# Patient Record
Sex: FEMALE | Race: WHITE | Employment: FULL TIME | URBAN - METROPOLITAN AREA
[De-identification: names, ages, dates, MRNs, and addresses within clinical notes are randomized per-mention and may not be internally consistent; named-entity substitution may affect disease eponyms.]

---

## 2018-12-14 ENCOUNTER — OFFICE VISIT (OUTPATIENT)
Dept: PRIMARY CARE CLINIC | Age: 23
End: 2018-12-14
Payer: COMMERCIAL

## 2018-12-14 VITALS
WEIGHT: 165.2 LBS | OXYGEN SATURATION: 98 % | DIASTOLIC BLOOD PRESSURE: 80 MMHG | TEMPERATURE: 98.1 F | SYSTOLIC BLOOD PRESSURE: 126 MMHG | HEART RATE: 73 BPM | HEIGHT: 70 IN | BODY MASS INDEX: 23.65 KG/M2

## 2018-12-14 DIAGNOSIS — Z23 NEED FOR VACCINATION: ICD-10-CM

## 2018-12-14 DIAGNOSIS — R06.6 CHRONIC HICCOUGHS: Primary | ICD-10-CM

## 2018-12-14 DIAGNOSIS — L20.9 ATOPIC DERMATITIS, UNSPECIFIED TYPE: ICD-10-CM

## 2018-12-14 DIAGNOSIS — Z11.4 ENCOUNTER FOR SCREENING FOR HIV: ICD-10-CM

## 2018-12-14 DIAGNOSIS — R13.10 DYSPHAGIA, UNSPECIFIED TYPE: ICD-10-CM

## 2018-12-14 PROCEDURE — 90715 TDAP VACCINE 7 YRS/> IM: CPT | Performed by: PHYSICIAN ASSISTANT

## 2018-12-14 PROCEDURE — 90472 IMMUNIZATION ADMIN EACH ADD: CPT | Performed by: PHYSICIAN ASSISTANT

## 2018-12-14 PROCEDURE — 90686 IIV4 VACC NO PRSV 0.5 ML IM: CPT | Performed by: PHYSICIAN ASSISTANT

## 2018-12-14 PROCEDURE — 99204 OFFICE O/P NEW MOD 45 MIN: CPT | Performed by: PHYSICIAN ASSISTANT

## 2018-12-14 PROCEDURE — 90471 IMMUNIZATION ADMIN: CPT | Performed by: PHYSICIAN ASSISTANT

## 2018-12-14 RX ORDER — NORGESTIMATE AND ETHINYL ESTRADIOL 7DAYSX3 28
1 KIT ORAL DAILY
Refills: 5 | COMMUNITY
Start: 2018-11-27 | End: 2021-04-02

## 2018-12-14 ASSESSMENT — ENCOUNTER SYMPTOMS
SINUS PAIN: 0
GASTROINTESTINAL NEGATIVE: 1
VOICE CHANGE: 1
RHINORRHEA: 0
SINUS PRESSURE: 0
TROUBLE SWALLOWING: 1
RESPIRATORY NEGATIVE: 1

## 2018-12-14 ASSESSMENT — PATIENT HEALTH QUESTIONNAIRE - PHQ9
SUM OF ALL RESPONSES TO PHQ9 QUESTIONS 1 & 2: 0
2. FEELING DOWN, DEPRESSED OR HOPELESS: 0
SUM OF ALL RESPONSES TO PHQ QUESTIONS 1-9: 0
SUM OF ALL RESPONSES TO PHQ QUESTIONS 1-9: 0
1. LITTLE INTEREST OR PLEASURE IN DOING THINGS: 0

## 2018-12-14 NOTE — PROGRESS NOTES
717 [facility] PRIMARY CARE  [id number] [facility]  Dept: 115 Ben Patton is a 21 y.o. female who presents today as an new patient for her medical conditionsas noted below. Chief Complaint   Patient presents with    New Patient     Last PCP: Dr. Letty Robbins.    Kendra Stewart     has them once every 2 hours. 3 times in the past 1.5 months her hiccups have made her vomit. HPI:     HPI  Sees GYN: [facility]   UTD    Can't burp and occ trouble swallowing. Hiccups every 2 hours or so and sometimes cluster of hiccups every since childhood    History of atopic dermatitis : has very dry face and chronic intermittent dry spots on arms and legs      No results found for: LDLCHOLESTEROL, LDLCALC    (goal LDL is <100)   No results found for: AST, ALT, BUN  BP Readings from Last 3 Encounters:   18 126/80          (goal 120/80)    Past Medical History:   Diagnosis Date    Ankle sprain     left    Eczema     Flat foot     Heart murmur of      Knee dislocation     bilateral    Wrist fracture, left       Past Surgical History:   Procedure Laterality Date    WISDOM TOOTH EXTRACTION         Family History   Problem Relation Age of Onset    Atrial Fibrillation Mother     Breast Cancer Maternal Aunt     Heart Failure Maternal Grandmother     Cancer Maternal Grandfather         multiple myeloma    Heart Disease Paternal Grandfather        Social History   Substance Use Topics    Smoking status: Never Smoker    Smokeless tobacco: Never Used    Alcohol use 2.4 - 3.0 oz/week     4 - 5 Cans of beer per week      Current Outpatient Prescriptions   Medication Sig Dispense Refill    TRI-SPRINTEC 0.18/0.215/0.25 MG-35 MCG TABS Take 1 tablet by mouth daily  5     No current facility-administered medications for this visit.       No Known Allergies    Health Maintenance   Topic Date Due    HIV screen

## 2019-01-07 DIAGNOSIS — Z11.4 ENCOUNTER FOR SCREENING FOR HIV: ICD-10-CM

## 2019-01-08 ENCOUNTER — OFFICE VISIT (OUTPATIENT)
Dept: PRIMARY CARE CLINIC | Age: 24
End: 2019-01-08
Payer: COMMERCIAL

## 2019-01-08 VITALS
OXYGEN SATURATION: 98 % | BODY MASS INDEX: 24.22 KG/M2 | SYSTOLIC BLOOD PRESSURE: 118 MMHG | WEIGHT: 167.6 LBS | HEART RATE: 71 BPM | DIASTOLIC BLOOD PRESSURE: 80 MMHG

## 2019-01-08 DIAGNOSIS — L20.9 ATOPIC DERMATITIS, UNSPECIFIED TYPE: Primary | ICD-10-CM

## 2019-01-08 DIAGNOSIS — Z11.1 PPD SCREENING TEST: ICD-10-CM

## 2019-01-08 DIAGNOSIS — L70.0 ACNE VULGARIS: ICD-10-CM

## 2019-01-08 PROCEDURE — 99213 OFFICE O/P EST LOW 20 MIN: CPT | Performed by: PHYSICIAN ASSISTANT

## 2019-01-08 PROCEDURE — 86580 TB INTRADERMAL TEST: CPT | Performed by: PHYSICIAN ASSISTANT

## 2019-01-10 ENCOUNTER — NURSE ONLY (OUTPATIENT)
Dept: PRIMARY CARE CLINIC | Age: 24
End: 2019-01-10

## 2019-01-10 DIAGNOSIS — Z11.1 ENCOUNTER FOR PPD SKIN TEST READING: Primary | ICD-10-CM

## 2019-01-10 LAB
INDURATION: NORMAL
TB SKIN TEST: NEGATIVE

## 2019-01-10 PROCEDURE — 99999 PR OFFICE/OUTPT VISIT,PROCEDURE ONLY: CPT | Performed by: PHYSICIAN ASSISTANT

## 2019-01-15 ENCOUNTER — NURSE ONLY (OUTPATIENT)
Dept: PRIMARY CARE CLINIC | Age: 24
End: 2019-01-15
Payer: COMMERCIAL

## 2019-01-15 DIAGNOSIS — Z11.1 SCREENING FOR TUBERCULOSIS: Primary | ICD-10-CM

## 2019-01-15 PROCEDURE — 86580 TB INTRADERMAL TEST: CPT | Performed by: PHYSICIAN ASSISTANT

## 2019-01-17 ENCOUNTER — NURSE ONLY (OUTPATIENT)
Dept: PRIMARY CARE CLINIC | Age: 24
End: 2019-01-17

## 2019-01-17 DIAGNOSIS — Z11.1 ENCOUNTER FOR PPD SKIN TEST READING: Primary | ICD-10-CM

## 2019-01-17 LAB
INDURATION: NORMAL
TB SKIN TEST: NEGATIVE

## 2019-02-19 ENCOUNTER — OFFICE VISIT (OUTPATIENT)
Dept: PRIMARY CARE CLINIC | Age: 24
End: 2019-02-19
Payer: COMMERCIAL

## 2019-02-19 VITALS
SYSTOLIC BLOOD PRESSURE: 130 MMHG | HEART RATE: 72 BPM | DIASTOLIC BLOOD PRESSURE: 84 MMHG | WEIGHT: 165.4 LBS | BODY MASS INDEX: 23.9 KG/M2 | OXYGEN SATURATION: 98 %

## 2019-02-19 DIAGNOSIS — L70.0 ACNE VULGARIS: Primary | ICD-10-CM

## 2019-02-19 DIAGNOSIS — L20.9 ATOPIC DERMATITIS, UNSPECIFIED TYPE: ICD-10-CM

## 2019-02-19 PROCEDURE — 99213 OFFICE O/P EST LOW 20 MIN: CPT | Performed by: PHYSICIAN ASSISTANT

## 2019-02-19 ASSESSMENT — ENCOUNTER SYMPTOMS
FACIAL SWELLING: 0
COLOR CHANGE: 0
RESPIRATORY NEGATIVE: 1
ABDOMINAL PAIN: 0
EYES NEGATIVE: 1

## 2019-02-19 ASSESSMENT — PATIENT HEALTH QUESTIONNAIRE - PHQ9
SUM OF ALL RESPONSES TO PHQ9 QUESTIONS 1 & 2: 0
SUM OF ALL RESPONSES TO PHQ QUESTIONS 1-9: 0
SUM OF ALL RESPONSES TO PHQ QUESTIONS 1-9: 0
1. LITTLE INTEREST OR PLEASURE IN DOING THINGS: 0
2. FEELING DOWN, DEPRESSED OR HOPELESS: 0

## 2019-10-30 ENCOUNTER — TELEPHONE (OUTPATIENT)
Dept: PRIMARY CARE CLINIC | Age: 24
End: 2019-10-30

## 2019-10-30 DIAGNOSIS — M25.562 ACUTE PAIN OF LEFT KNEE: Primary | ICD-10-CM

## 2019-10-30 DIAGNOSIS — S83.105A DISLOCATION OF LEFT KNEE, INITIAL ENCOUNTER: ICD-10-CM

## 2019-11-04 DIAGNOSIS — M25.562 ACUTE PAIN OF LEFT KNEE: ICD-10-CM

## 2019-11-04 DIAGNOSIS — S83.105A DISLOCATION OF LEFT KNEE, INITIAL ENCOUNTER: ICD-10-CM

## 2021-04-02 ENCOUNTER — OFFICE VISIT (OUTPATIENT)
Dept: PRIMARY CARE CLINIC | Age: 26
End: 2021-04-02
Payer: COMMERCIAL

## 2021-04-02 VITALS
BODY MASS INDEX: 24.2 KG/M2 | OXYGEN SATURATION: 98 % | DIASTOLIC BLOOD PRESSURE: 76 MMHG | HEART RATE: 79 BPM | SYSTOLIC BLOOD PRESSURE: 128 MMHG | WEIGHT: 169 LBS | HEIGHT: 70 IN | TEMPERATURE: 97.4 F

## 2021-04-02 DIAGNOSIS — R00.0 TACHYCARDIA: Primary | ICD-10-CM

## 2021-04-02 DIAGNOSIS — R55 NEAR SYNCOPE: ICD-10-CM

## 2021-04-02 DIAGNOSIS — M24.9 HYPERMOBILITY OF JOINT: ICD-10-CM

## 2021-04-02 DIAGNOSIS — R68.89 SENSATION OF FEELING HOT: ICD-10-CM

## 2021-04-02 PROCEDURE — 99214 OFFICE O/P EST MOD 30 MIN: CPT | Performed by: PHYSICIAN ASSISTANT

## 2021-04-02 RX ORDER — NORETHINDRONE ACETATE AND ETHINYL ESTRADIOL AND FERROUS FUMARATE 1MG-20(24)
KIT ORAL
COMMUNITY
Start: 2020-06-02

## 2021-04-02 SDOH — ECONOMIC STABILITY: TRANSPORTATION INSECURITY
IN THE PAST 12 MONTHS, HAS LACK OF TRANSPORTATION KEPT YOU FROM MEETINGS, WORK, OR FROM GETTING THINGS NEEDED FOR DAILY LIVING?: NO

## 2021-04-02 SDOH — ECONOMIC STABILITY: FOOD INSECURITY: WITHIN THE PAST 12 MONTHS, THE FOOD YOU BOUGHT JUST DIDN'T LAST AND YOU DIDN'T HAVE MONEY TO GET MORE.: NEVER TRUE

## 2021-04-02 SDOH — ECONOMIC STABILITY: TRANSPORTATION INSECURITY
IN THE PAST 12 MONTHS, HAS THE LACK OF TRANSPORTATION KEPT YOU FROM MEDICAL APPOINTMENTS OR FROM GETTING MEDICATIONS?: NO

## 2021-04-02 ASSESSMENT — ENCOUNTER SYMPTOMS
VOMITING: 1
SINUS PAIN: 0
SINUS PRESSURE: 0
SORE THROAT: 0
NAUSEA: 1
ABDOMINAL PAIN: 0
EYE PAIN: 0
SHORTNESS OF BREATH: 0
CONSTIPATION: 0
CHEST TIGHTNESS: 0
DIARRHEA: 0

## 2021-04-02 ASSESSMENT — PATIENT HEALTH QUESTIONNAIRE - PHQ9
1. LITTLE INTEREST OR PLEASURE IN DOING THINGS: 0
2. FEELING DOWN, DEPRESSED OR HOPELESS: 0
SUM OF ALL RESPONSES TO PHQ QUESTIONS 1-9: 0
SUM OF ALL RESPONSES TO PHQ9 QUESTIONS 1 & 2: 0

## 2021-04-02 NOTE — PROGRESS NOTES
717 [facility] PRIMARY CARE  [address] [place] [id number]  Dept: 115 Ben Patton is a 32 y.o. female who presents today for her medical conditions/complaints as noted below. Chief Complaint   Patient presents with    Other     patient concerned for Connective tissue issues. Patient said she had Episodes of inability to regulate body temp, increase HR with excercise and hyper flexibility. Patient said she has hx of both knees nd shoulders dislocating. HPI:     HPI     Pt states she has hypermobile joints, multiple bilateral knee and shoulder dislocations, double jointed in all finger, long fingers, and easy bruising. She states she had a heart murmur as an infant that she states has closed. She states she has episodes were she feels like she is over heating, sees \"black spots\", nausea, and vomiting. States episodes occur approximately twice a month. She denies any activity or position that causes the episodes. Pt state she has an \"exersize intolerance\". She states she feels her heart \"pounding\" and her heart rate increases to approximately 180 bpm which she monitors with apple watch. Pt denies any family history of connective tissue disorders but states her brother and sister has similar physical symptoms. Pt states she cannot belch and has episodes of painful hicupping that can last for minutes to hours. Also sometimes feels a fullness in her epigastric area.       No results found for: LDLCHOLESTEROL, LDLCALC    (goal LDL is <100)   No results found for: AST, ALT, BUN  BP Readings from Last 3 Encounters:   21 128/76   19 130/84   19 118/80          (goal 120/80)    Past Medical History:   Diagnosis Date    Ankle sprain     left    Eczema     Flat foot     Heart murmur of      Knee dislocation     bilateral    Wrist fracture, left       Past Surgical History:   Procedure Laterality Date    DENISHA TOOTH EXTRACTION         Family History   Problem Relation Age of Onset    Atrial Fibrillation Mother     Breast Cancer Maternal Aunt     Heart Failure Maternal Grandmother     Cancer Maternal Grandfather         multiple myeloma    Heart Disease Paternal Grandfather        Social History     Tobacco Use    Smoking status: Never Smoker    Smokeless tobacco: Never Used   Substance Use Topics    Alcohol use: Yes     Alcohol/week: 4.0 - 5.0 standard drinks     Types: 4 - 5 Cans of beer per week      Current Outpatient Medications   Medication Sig Dispense Refill    Norethin Ace-Eth Estrad-FE (NESTOR 24 FE) 1-20 MG-MCG(24) CHEW       NONFORMULARY       tretinoin (RETIN-A) 0.025 % cream Apply topically nightly. 45 g 0     No current facility-administered medications for this visit. Allergies   Allergen Reactions    Nickel Rash       Health Maintenance   Topic Date Due    Hepatitis C screen  Never done    HPV vaccine (1 - 2-dose series) Never done    COVID-19 Vaccine (1) Never done    Cervical cancer screen  Never done    Flu vaccine (Season Ended) 09/01/2021    DTaP/Tdap/Td vaccine (8 - Td) 12/14/2028    Hepatitis B vaccine  Completed    Hib vaccine  Completed    Meningococcal (ACWY) vaccine  Completed    HIV screen  Completed    Hepatitis A vaccine  Aged Out    Pneumococcal 0-64 years Vaccine  Aged Out    Varicella vaccine  Discontinued       Subjective:      Review of Systems   Constitutional: Negative for appetite change, chills, fatigue, fever and unexpected weight change. HENT: Negative for ear pain, mouth sores, sinus pressure, sinus pain and sore throat. No mouth sores   Eyes: Positive for visual disturbance. Negative for pain. Respiratory: Negative for chest tightness and shortness of breath. Cardiovascular: Positive for palpitations (\"heart racing\"). Negative for chest pain and leg swelling. Gastrointestinal: Positive for nausea and vomiting.  Negative for abdominal pain, constipation and diarrhea. Endocrine: Negative for cold intolerance and heat intolerance. Genitourinary: Negative for dysuria, frequency and urgency. Musculoskeletal: Negative for arthralgias, joint swelling and myalgias. Allergic/Immunologic: Negative for environmental allergies and food allergies. Neurological: Positive for dizziness (occasional when going from sitting to standing) and syncope (near syncope). Negative for numbness and headaches. Psychiatric/Behavioral: Negative for decreased concentration. Objective:     /76   Pulse 79   Temp 97.4 °F (36.3 °C)   Ht 5' 9.75\" (1.772 m)   Wt 169 lb (76.7 kg)   SpO2 98%   BMI 24.42 kg/m²   Physical Exam  Constitutional:       General: She is not in acute distress. Appearance: Normal appearance. She is normal weight. She is not ill-appearing, toxic-appearing or diaphoretic. HENT:      Head: Normocephalic. Cardiovascular:      Rate and Rhythm: Normal rate and regular rhythm. Heart sounds: Normal heart sounds. No murmur. No gallop. Pulmonary:      Effort: Pulmonary effort is normal. No respiratory distress. Breath sounds: Normal breath sounds. No stridor. No wheezing, rhonchi or rales. Skin:     General: Skin is warm and dry. Coloration: Skin is not jaundiced. Findings: No bruising. Neurological:      General: No focal deficit present. Mental Status: She is alert and oriented to person, place, and time. Psychiatric:         Mood and Affect: Mood normal.         Behavior: Behavior normal.         Thought Content: Thought content normal.         Judgment: Judgment normal.         Assessment:       Diagnosis Orders   1. Tachycardia  JOANNA Screen with Reflex    C-Reactive Protein    Sedimentation rate, automated    Rheumatoid Factor    TSH without Reflex    T4, Free    CBC Auto Differential    Comprehensive Metabolic Panel, Fasting    ECHO Complete 2D W Doppler W Color   2.  Near syncope defined types were placed in this encounter. Patient given educational materials - see patient instructions. Discussed use, benefit, and side effects of prescribed medications. All patient questions answered. Pt voiced understanding. Reviewed health maintenance. Instructed to continue current medications, diet and exercise. Patient agreed with treatment plan. Follow up as directed.      Electronically signed by Carlos Echeverria PA-C on 4/2/2021 at 12:43 PM

## 2021-04-03 ENCOUNTER — HOSPITAL ENCOUNTER (OUTPATIENT)
Age: 26
Discharge: HOME | End: 2021-04-03
Payer: COMMERCIAL

## 2021-04-03 DIAGNOSIS — M24.9 HYPERMOBILITY OF JOINT: ICD-10-CM

## 2021-04-03 DIAGNOSIS — R68.89 SENSATION OF FEELING HOT: ICD-10-CM

## 2021-04-03 DIAGNOSIS — R00.0 TACHYCARDIA: ICD-10-CM

## 2021-04-03 DIAGNOSIS — R55 NEAR SYNCOPE: ICD-10-CM

## 2021-04-03 LAB
ABSOLUTE EOS #: 0.06 K/UL (ref 0–0.44)
ABSOLUTE IMMATURE GRANULOCYTE: <0.03 K/UL (ref 0–0.3)
ABSOLUTE LYMPH #: 1.83 K/UL (ref 1.1–3.7)
ABSOLUTE MONO #: 0.31 K/UL (ref 0.1–1.2)
ALBUMIN SERPL-MCNC: 4.2 G/DL (ref 3.5–5.2)
ALBUMIN/GLOBULIN RATIO: 1.4 (ref 1–2.5)
ALP BLD-CCNC: 52 U/L (ref 35–104)
ALT SERPL-CCNC: 9 U/L (ref 5–33)
ANION GAP SERPL CALCULATED.3IONS-SCNC: 10 MMOL/L (ref 9–17)
AST SERPL-CCNC: 13 U/L
BASOPHILS # BLD: 1 % (ref 0–2)
BASOPHILS ABSOLUTE: <0.03 K/UL (ref 0–0.2)
BILIRUB SERPL-MCNC: 0.8 MG/DL (ref 0.3–1.2)
BUN BLDV-MCNC: 11 MG/DL (ref 6–20)
BUN/CREAT BLD: NORMAL (ref 9–20)
C-REACTIVE PROTEIN: 6.3 MG/L (ref 0–5)
CALCIUM SERPL-MCNC: 9.2 MG/DL (ref 8.6–10.4)
CHLORIDE BLD-SCNC: 103 MMOL/L (ref 98–107)
CO2: 23 MMOL/L (ref 20–31)
CREAT SERPL-MCNC: 0.63 MG/DL (ref 0.5–0.9)
DIFFERENTIAL TYPE: ABNORMAL
EOSINOPHILS RELATIVE PERCENT: 1 % (ref 1–4)
GFR AFRICAN AMERICAN: >60 ML/MIN
GFR NON-AFRICAN AMERICAN: >60 ML/MIN
GFR SERPL CREATININE-BSD FRML MDRD: NORMAL ML/MIN/{1.73_M2}
GFR SERPL CREATININE-BSD FRML MDRD: NORMAL ML/MIN/{1.73_M2}
GLUCOSE FASTING: 87 MG/DL (ref 70–99)
HCT VFR BLD CALC: 41.9 % (ref 36.3–47.1)
HEMOGLOBIN: 13.8 G/DL (ref 11.9–15.1)
IMMATURE GRANULOCYTES: 1 %
LYMPHOCYTES # BLD: 42 % (ref 24–43)
MCH RBC QN AUTO: 28.8 PG (ref 25.2–33.5)
MCHC RBC AUTO-ENTMCNC: 32.9 G/DL (ref 28.4–34.8)
MCV RBC AUTO: 87.5 FL (ref 82.6–102.9)
MONOCYTES # BLD: 7 % (ref 3–12)
NRBC AUTOMATED: 0 PER 100 WBC
PDW BLD-RTO: 11.8 % (ref 11.8–14.4)
PLATELET # BLD: 252 K/UL (ref 138–453)
PLATELET ESTIMATE: ABNORMAL
PMV BLD AUTO: 9.5 FL (ref 8.1–13.5)
POTASSIUM SERPL-SCNC: 4.2 MMOL/L (ref 3.7–5.3)
RBC # BLD: 4.79 M/UL (ref 3.95–5.11)
RBC # BLD: ABNORMAL 10*6/UL
RHEUMATOID FACTOR: <10 IU/ML
SEDIMENTATION RATE, ERYTHROCYTE: 2 MM (ref 0–20)
SEG NEUTROPHILS: 48 % (ref 36–65)
SEGMENTED NEUTROPHILS ABSOLUTE COUNT: 2.08 K/UL (ref 1.5–8.1)
SODIUM BLD-SCNC: 136 MMOL/L (ref 135–144)
THYROXINE, FREE: 1.34 NG/DL (ref 0.93–1.7)
TOTAL PROTEIN: 7.1 G/DL (ref 6.4–8.3)
TSH SERPL DL<=0.05 MIU/L-ACNC: 2 MIU/L (ref 0.3–5)
WBC # BLD: 4.3 K/UL (ref 3.5–11.3)
WBC # BLD: ABNORMAL 10*3/UL

## 2021-04-03 PROCEDURE — 84439 ASSAY OF FREE THYROXINE: CPT

## 2021-04-03 PROCEDURE — 85025 COMPLETE CBC W/AUTO DIFF WBC: CPT

## 2021-04-03 PROCEDURE — 84443 ASSAY THYROID STIM HORMONE: CPT

## 2021-04-03 PROCEDURE — 86038 ANTINUCLEAR ANTIBODIES: CPT

## 2021-04-03 PROCEDURE — 86431 RHEUMATOID FACTOR QUANT: CPT

## 2021-04-03 PROCEDURE — 86140 C-REACTIVE PROTEIN: CPT

## 2021-04-03 PROCEDURE — 80053 COMPREHEN METABOLIC PANEL: CPT

## 2021-04-03 PROCEDURE — 36415 COLL VENOUS BLD VENIPUNCTURE: CPT

## 2021-04-03 PROCEDURE — 85652 RBC SED RATE AUTOMATED: CPT

## 2021-04-05 LAB — ANTI-NUCLEAR ANTIBODY (ANA): NEGATIVE

## 2021-04-08 ENCOUNTER — HOSPITAL ENCOUNTER (OUTPATIENT)
Dept: NON INVASIVE DIAGNOSTICS | Age: 26
Discharge: HOME | End: 2021-04-10
Payer: COMMERCIAL

## 2021-04-08 DIAGNOSIS — R00.0 TACHYCARDIA: ICD-10-CM

## 2021-04-08 DIAGNOSIS — R55 NEAR SYNCOPE: ICD-10-CM

## 2021-04-08 LAB
LV EF: 55 %
LVEF MODALITY: NORMAL

## 2021-04-08 PROCEDURE — 93306 TTE W/DOPPLER COMPLETE: CPT

## 2021-05-06 ENCOUNTER — OFFICE VISIT (OUTPATIENT)
Dept: PRIMARY CARE CLINIC | Age: 26
End: 2021-05-06
Payer: COMMERCIAL

## 2021-05-06 VITALS
HEIGHT: 70 IN | DIASTOLIC BLOOD PRESSURE: 70 MMHG | SYSTOLIC BLOOD PRESSURE: 124 MMHG | BODY MASS INDEX: 24.05 KG/M2 | OXYGEN SATURATION: 95 % | HEART RATE: 87 BPM | WEIGHT: 168 LBS

## 2021-05-06 DIAGNOSIS — R55 NEAR SYNCOPE: ICD-10-CM

## 2021-05-06 DIAGNOSIS — M24.9 HYPERMOBILITY OF JOINT: ICD-10-CM

## 2021-05-06 DIAGNOSIS — R68.89 SENSATION OF FEELING HOT: ICD-10-CM

## 2021-05-06 DIAGNOSIS — R00.0 TACHYCARDIA: Primary | ICD-10-CM

## 2021-05-06 PROCEDURE — 99213 OFFICE O/P EST LOW 20 MIN: CPT | Performed by: PHYSICIAN ASSISTANT

## 2021-05-06 ASSESSMENT — ENCOUNTER SYMPTOMS
RESPIRATORY NEGATIVE: 1
VOMITING: 1

## 2021-05-06 NOTE — PATIENT INSTRUCTIONS
Patient Education        Vasovagal Syncope: Care Instructions  Your Care Instructions     Vasovagal syncope (say \"uay-cim-LEAMeredith DOSS-kuh-pee\")is sudden dizziness or fainting that can be set off by things such as pain, stress, fear, or trauma. You may sweat or feel lightheaded, sick to your stomach, or tingly. The problem causes the heart rate to slow and the blood vessels to widen, or dilate, for a short time. When this happens, blood pools in the lower body, and less blood goes to the brain. You can usually get relief by lying down with your legs raised (elevated). This helps more blood to flow to your brain and may help relieve symptoms like feeling dizzy. Some doctors may recommend a technique that involves tensing your fists and arms. This type of fainting is often easy to predict. For example, it happens to some people when they see blood or have to get a shot. They may feel symptoms before they faint. An episode of vasovagal syncope usually responds well to self-care. Other treatment often isn't needed. But if the fainting keeps happening, your doctor may suggest further treatments. Follow-up care is a key part of your treatment and safety. Be sure to make and go to all appointments, and call your doctor if you are having problems. It's also a good idea to know your test results and keep a list of the medicines you take. How can you care for yourself at home? · Drink plenty of fluids to prevent dehydration. If you have kidney, heart, or liver disease and have to limit fluids, talk with your doctor before you increase your fluid intake. · Try to avoid things that you think may set off vasovagal syncope. · Talk to your doctor about any medicines you take. Some medicines may increase the chance of this condition occurring. · If you feel symptoms, lie down with your legs raised. Talk to your doctor about what to do if your symptoms come back. When should you call for help?    Call 911 anytime you think you may need emergency care. For example, call if:    · You have symptoms of a heart problem. These may include:  ? Chest pain or pressure. ? Severe trouble breathing. ? A fast or irregular heartbeat. Watch closely for changes in your health, and be sure to contact your doctor if:    · You have more episodes of fainting at home.     · You do not get better as expected. Where can you learn more? Go to [email or web address] and sign in to your [employer] account. Enter L754 in the [employer] box to learn more about \"Vasovagal Syncope: Care Instructions. \"     If you do not have an account, please click on the \"Sign Up Now\" link. Current as of: February 26, 2020               Content Version: 12.8  © 1068-9535 Healthwise, Incorporated. Care instructions adapted under license by [facility] ([facility]). If you have questions about a medical condition or this instruction, always ask your healthcare professional. [place] [address] any warranty or liability for your use of this information.

## 2021-05-06 NOTE — PROGRESS NOTES
717 [facility] PRIMARY CARE  [address] [address]  Dept: [address] Marta Wilhelm is a 32 y.o. female who presents today for her medical conditions/complaints as noted below. Chief Complaint   Patient presents with    Discuss Labs     discuss Echo and Lab work, results in chart        HPI:     HPI  Labs are normal  Echo is normal   Concern for Marfan's with arm span and symptoms. No results found for: LDLCHOLESTEROL, LDLCALC    (goal LDL is <100)   AST (U/L)   Date Value   2021 13     ALT (U/L)   Date Value   2021 9     BUN (mg/dL)   Date Value   2021 11     BP Readings from Last 3 Encounters:   21 124/70   21 128/76   19 130/84          (goal 120/80)    Past Medical History:   Diagnosis Date    Ankle sprain     left    Eczema     Flat foot     Heart murmur of      Knee dislocation     bilateral    Wrist fracture, left       Past Surgical History:   Procedure Laterality Date    WISDOM TOOTH EXTRACTION         Family History   Problem Relation Age of Onset    Atrial Fibrillation Mother     Breast Cancer Maternal Aunt     Heart Failure Maternal Grandmother     Cancer Maternal Grandfather         multiple myeloma    Heart Disease Paternal Grandfather        Social History     Tobacco Use    Smoking status: Never Smoker    Smokeless tobacco: Never Used   Substance Use Topics    Alcohol use: Yes     Alcohol/week: 4.0 - 5.0 standard drinks     Types: 4 - 5 Cans of beer per week      Current Outpatient Medications   Medication Sig Dispense Refill    Norethin Ace-Eth Estrad-FE (NESTOR 24 FE) 1-20 MG-MCG(24) CHEW       NONFORMULARY       tretinoin (RETIN-A) 0.025 % cream Apply topically nightly. 45 g 0     No current facility-administered medications for this visit.       Allergies   Allergen Reactions    Nickel Rash       Health Maintenance   Topic Date Due    Hepatitis C screen Never done    HPV vaccine (1 - 2-dose series) Never done    COVID-19 Vaccine (1) Never done    Cervical cancer screen  Never done    Flu vaccine (Season Ended) 09/01/2021    DTaP/Tdap/Td vaccine (8 - Td) 12/14/2028    Hepatitis B vaccine  Completed    Hib vaccine  Completed    Meningococcal (ACWY) vaccine  Completed    HIV screen  Completed    Hepatitis A vaccine  Aged Out    Pneumococcal 0-64 years Vaccine  Aged Out    Varicella vaccine  Discontinued       Subjective:      Review of Systems   Constitutional: Positive for fatigue. Negative for chills, diaphoresis and fever. Respiratory: Negative. Cardiovascular: Positive for palpitations. Gastrointestinal: Positive for vomiting (dry heaving helps the hot flashes). Endocrine: Positive for heat intolerance (2 episodes of feeling really hot). Neurological: Positive for dizziness and syncope. Objective:     /70   Pulse 87   Ht 5' 9.75\" (1.772 m)   Wt 168 lb (76.2 kg)   SpO2 95%   BMI 24.28 kg/m²   Physical Exam  Vitals signs and nursing note reviewed. Constitutional:       Appearance: Normal appearance. Neck:      Vascular: No carotid bruit, hepatojugular reflux or JVD. Cardiovascular:      Rate and Rhythm: Normal rate and regular rhythm. Heart sounds: Normal heart sounds. Pulmonary:      Effort: Pulmonary effort is normal.      Breath sounds: Normal breath sounds. No wheezing, rhonchi or rales. Abdominal:      General: Abdomen is flat. Palpations: Abdomen is soft. Comments: No pulsation over aorta and no bruits in abdomen   Musculoskeletal:      Right lower leg: No edema. Left lower leg: No edema. Neurological:      Mental Status: She is alert. Assessment:       Diagnosis Orders   1. Tachycardia  CANDE - Liu Greene MD, Cardiology, [facility]    Cardiac event monitor   2. Near syncope  CANDE Greene MD, Cardiology, [facility]    Cardiac event monitor   3. Sensation of feeling hot     4. Hypermobility of joint          Plan:    Orthostats today  REviewed labs and Echo  REferred to Cardiology---NCS? Dysautonomia? Marfan's? Increase water and salt in diet. Return in about 2 months (around 7/6/2021) for or after cardio. Orders Placed This Encounter   Procedures   Shruthi Ryan MD, Cardiology, [facility]     Referral Priority:   Routine     Referral Type:   Eval and Treat     Referral Reason:   Specialty Services Required     Referred to Provider:   Bella Rivas MD     Requested Specialty:   Cardiology     Number of Visits Requested:   1    Cardiac event monitor     7 day     Standing Status:   Future     Standing Expiration Date:   7/5/2021     No orders of the defined types were placed in this encounter. Patient given educational materials - see patient instructions. Discussed use, benefit, and side effects of prescribed medications. All patient questions answered. Pt voiced understanding. Reviewed health maintenance. Instructed to continue current medications, diet and exercise. Patient agreed with treatment plan. Follow up as directed.      Electronically signed by Andre Obregon PA-C on 5/6/2021 at 4:18 PM

## 2021-06-03 ENCOUNTER — HOSPITAL ENCOUNTER (OUTPATIENT)
Dept: NON INVASIVE DIAGNOSTICS | Age: 26
Discharge: HOME | End: 2021-06-03
Payer: COMMERCIAL

## 2021-06-03 DIAGNOSIS — R00.0 TACHYCARDIA: ICD-10-CM

## 2021-06-03 DIAGNOSIS — R55 NEAR SYNCOPE: ICD-10-CM

## 2021-06-03 PROCEDURE — 93271 ECG/MONITORING AND ANALYSIS: CPT

## 2021-06-03 PROCEDURE — 93270 REMOTE 30 DAY ECG REV/REPORT: CPT

## 2021-06-12 NOTE — PROCEDURES
[address] [facility] [address]                                 EVENT MONITOR    PATIENT NAME: Edison Nam              :        1995  MED REC NO:   [id number]                             ROOM:  ACCOUNT NO:   [de-identified]                           ADMIT DATE: 2021  PROVIDER:     Henrietta Espinoza    TEST TYPE:  7 Day Event monitor  INDICATION FOR STUDY: Tachycardia  ORDERING PROVIDER: Nadeen Castillo PA-C  PRIMARY CARE PROVIDER: Nadeen Castillo PA-C  INTERPRETING PHYSICIAN: Dr. Lydia Bass: The patient appeared to remain in sinus rhythm throughout  recording with sinus bradycardia and sinus tachycardia noted. Rare PAC'S  were noted. One non-sustained PAT was noted with longest lasting 15  beats in duration and peaking at 207 bpm. Rare PVC'S with couplets were  noted. Patient events were noted. INTERPRETATION:    1. Sinus rhythm with sinus bradycardia and sinus tachycardia  2. Rare PAC'S  3. One non-sustained episode of PAT  4.  Rare PVC'S with couplets                [facility]    D: 2021 14:04:57       T: 2021 14:06:17     [id number]  Job#: [id number]     Doc#: Unknown    CC:    (Retain this field even if not dictated or not decipherable)

## 2021-06-17 ENCOUNTER — OFFICE VISIT (OUTPATIENT)
Dept: PRIMARY CARE CLINIC | Age: 26
End: 2021-06-17
Payer: COMMERCIAL

## 2021-06-17 VITALS
HEIGHT: 70 IN | BODY MASS INDEX: 23.48 KG/M2 | HEART RATE: 72 BPM | OXYGEN SATURATION: 98 % | SYSTOLIC BLOOD PRESSURE: 124 MMHG | WEIGHT: 164 LBS | DIASTOLIC BLOOD PRESSURE: 68 MMHG

## 2021-06-17 DIAGNOSIS — L90.6 STRIAE ATROPHIC: ICD-10-CM

## 2021-06-17 DIAGNOSIS — M24.9 HYPERMOBILITY OF JOINT: Primary | ICD-10-CM

## 2021-06-17 DIAGNOSIS — R00.0 TACHYCARDIA: ICD-10-CM

## 2021-06-17 PROCEDURE — 99213 OFFICE O/P EST LOW 20 MIN: CPT | Performed by: PHYSICIAN ASSISTANT

## 2021-06-17 ASSESSMENT — ENCOUNTER SYMPTOMS
SHORTNESS OF BREATH: 1
COUGH: 0
WHEEZING: 0
CHEST TIGHTNESS: 1

## 2021-06-17 NOTE — PROGRESS NOTES
717 [facility] PRIMARY CARE  [address] [address]  Dept: [address] is a 32 y.o. female who presents today for her medical conditions/complaints as noted below. Chief Complaint   Patient presents with    Results     Review heart monitor        HPI:     HPI  Echo not showing aortic root dilation or MV issues. Holter showing rare PACs with one non sustained episode of PAT and rare PVCs with couplets. Had one episode of \"overheating\" and \"black spots\"episode  Appt with cardiology  with electrophysiologist  Luis's Danos syndrome? ??  No results found for: LDLCHOLESTEROL, LDLCALC    (goal LDL is <100)   AST (U/L)   Date Value   2021 13     ALT (U/L)   Date Value   2021 9     BUN (mg/dL)   Date Value   2021 11     BP Readings from Last 3 Encounters:   21 124/68   21 124/70   21 128/76          (goal 120/80)    Past Medical History:   Diagnosis Date    Ankle sprain     left    Eczema     Flat foot     Heart murmur of      Knee dislocation     bilateral    Wrist fracture, left       Past Surgical History:   Procedure Laterality Date    WISDOM TOOTH EXTRACTION         Family History   Problem Relation Age of Onset    Atrial Fibrillation Mother     Breast Cancer Maternal Aunt     Heart Failure Maternal Grandmother     Cancer Maternal Grandfather         multiple myeloma    Heart Disease Paternal Grandfather        Social History     Tobacco Use    Smoking status: Never Smoker    Smokeless tobacco: Never Used   Substance Use Topics    Alcohol use: Yes     Alcohol/week: 4.0 - 5.0 standard drinks     Types: 4 - 5 Cans of beer per week      Current Outpatient Medications   Medication Sig Dispense Refill    Norethin Ace-Eth Estrad-FE (NESTOR 24 FE) 1-20 MG-MCG(24) CHEW       NONFORMULARY       tretinoin (RETIN-A) 0.025 % cream Apply topically nightly.  45 g 0     No Zaida   3. Tachycardia  [facility], [facility]        Plan:    See cardiology  See genetics for an opinion   Return for after referral appts. No orders of the defined types were placed in this encounter. No orders of the defined types were placed in this encounter. Patient given educational materials - see patient instructions. Discussed use, benefit, and side effects of prescribed medications. All patient questions answered. Pt voiced understanding. Reviewed health maintenance. Instructed to continue current medications, diet and exercise. Patient agreed with treatment plan. Follow up as directed.      Electronically signed by Richard Haines PA-C on 6/17/2021 at 10:13 AM

## 2021-06-22 ENCOUNTER — PATIENT MESSAGE (OUTPATIENT)
Dept: PRIMARY CARE CLINIC | Age: 26
End: 2021-06-22

## 2021-06-22 DIAGNOSIS — R00.0 TACHYCARDIA: ICD-10-CM

## 2021-06-22 DIAGNOSIS — L90.6 STRIAE ATROPHIC: ICD-10-CM

## 2021-06-22 DIAGNOSIS — R06.6 CHRONIC HICCOUGHS: ICD-10-CM

## 2021-06-22 DIAGNOSIS — R13.10 DYSPHAGIA, UNSPECIFIED TYPE: ICD-10-CM

## 2021-06-22 DIAGNOSIS — M24.9 HYPERMOBILITY OF JOINT: Primary | ICD-10-CM

## 2021-06-22 DIAGNOSIS — R55 NEAR SYNCOPE: ICD-10-CM

## 2021-06-28 NOTE — TELEPHONE ENCOUNTER
Referral, notes, insurance info, and patient summary faxed to [facility] [phone].

## 2021-07-14 ENCOUNTER — HOSPITAL ENCOUNTER (OUTPATIENT)
Dept: CARDIAC CATH/INVASIVE PROCEDURES | Age: 26
Discharge: HOME | End: 2021-07-14
Payer: COMMERCIAL

## 2021-07-14 PROCEDURE — 93660 TILT TABLE EVALUATION: CPT | Performed by: INTERNAL MEDICINE

## 2021-07-14 NOTE — PROCEDURES
[facility] Cardiology Cardiology    Tilt Table Test Report                       Today's Date: 7/14/2021  Patient Name: Desean Wilhelm  Date of admission: 7/14/2021 11:28 AM  Patient's age: 32 y.o., 1995  Admission Dx: Syncope [R55]    Requesting Physician: No admitting provider for patient encounter. Procedures performed:    Tilt table testing    Indication of the procedure:  Marine Fairchild is a 32 y.o. female presented with syncope. Details of procedure:    Procedure's risks, benefits and alternatives of procedure were explained to patient. All questions were answered. Patient understood and informed consent was obtained. The patient was brought to the electrophysiology lab in a fasting nonsedated state. Peripheral IV access was obtained and he was put on the tilt table test.    Initial vital signs at baseline:    BP: 125/66  HR: 66 and sinus      Then the tilt table was raised to 70 degree. After 10 minutes, the patient felt hot, dizzy and nausea and presyncopal,  the vitals were:     BP: Not able to obtain  HR: 54 and sinus        The Tilt table test was immediately brought back to [place] position and she received IV fluid bolus. Her symptoms resolved and she wake up without complications. At the end of procedure:   BP: 122/88   HR: 58 and sinus     The patient tolerated the procedure well and there were no complications. Conclusion:    Positive neurocardiogenic syncope    Recommendation:   (choose applicable one)    · Recommended to avoid dehydration, paying close attention to any prodromal symptoms and how to avert syncope by lying down and elevating legs. · Patient also to have compression stocking and encouraged to use them when working long hours and need to stand for a long time. · Specific techniques to decrease the pooling of blood in legs such as foot exercises, or tensing leg muscles when standing and increasing salt in diet were given.    · Medication (e.g. fluorine or others as instructed) if symptoms recurs despite these measurements, then medication can be used. · Implantation of loop recorder if all measures failed    Discussed with patient and Nurse.     Symone Felder MD, MD [facility] Consult           [phone]

## 2022-07-14 ENCOUNTER — OFFICE VISIT (OUTPATIENT)
Dept: PRIMARY CARE CLINIC | Age: 27
End: 2022-07-14
Payer: COMMERCIAL

## 2022-07-14 VITALS
SYSTOLIC BLOOD PRESSURE: 126 MMHG | OXYGEN SATURATION: 99 % | HEART RATE: 73 BPM | WEIGHT: 154 LBS | HEIGHT: 70 IN | DIASTOLIC BLOOD PRESSURE: 70 MMHG | BODY MASS INDEX: 22.05 KG/M2

## 2022-07-14 DIAGNOSIS — Q79.62 HYPERMOBILE EHLERS-DANLOS SYNDROME: Primary | ICD-10-CM

## 2022-07-14 DIAGNOSIS — G90.A POTS (POSTURAL ORTHOSTATIC TACHYCARDIA SYNDROME): ICD-10-CM

## 2022-07-14 PROBLEM — R13.10 DYSPHAGIA: Status: RESOLVED | Noted: 2018-12-14 | Resolved: 2022-07-14

## 2022-07-14 PROCEDURE — 99213 OFFICE O/P EST LOW 20 MIN: CPT | Performed by: PHYSICIAN ASSISTANT

## 2022-07-14 SDOH — ECONOMIC STABILITY: FOOD INSECURITY: WITHIN THE PAST 12 MONTHS, THE FOOD YOU BOUGHT JUST DIDN'T LAST AND YOU DIDN'T HAVE MONEY TO GET MORE.: NEVER TRUE

## 2022-07-14 SDOH — ECONOMIC STABILITY: FOOD INSECURITY: WITHIN THE PAST 12 MONTHS, YOU WORRIED THAT YOUR FOOD WOULD RUN OUT BEFORE YOU GOT MONEY TO BUY MORE.: NEVER TRUE

## 2022-07-14 ASSESSMENT — PATIENT HEALTH QUESTIONNAIRE - PHQ9
SUM OF ALL RESPONSES TO PHQ QUESTIONS 1-9: 0
1. LITTLE INTEREST OR PLEASURE IN DOING THINGS: 0
SUM OF ALL RESPONSES TO PHQ9 QUESTIONS 1 & 2: 0
2. FEELING DOWN, DEPRESSED OR HOPELESS: 0
SUM OF ALL RESPONSES TO PHQ QUESTIONS 1-9: 0

## 2022-07-14 ASSESSMENT — ENCOUNTER SYMPTOMS
COUGH: 0
ABDOMINAL PAIN: 0
SHORTNESS OF BREATH: 0
WHEEZING: 0
EYE DISCHARGE: 0
DIARRHEA: 0
EYE REDNESS: 0
NAUSEA: 0
VOMITING: 0
SORE THROAT: 0
RHINORRHEA: 0

## 2022-07-14 ASSESSMENT — SOCIAL DETERMINANTS OF HEALTH (SDOH): HOW HARD IS IT FOR YOU TO PAY FOR THE VERY BASICS LIKE FOOD, HOUSING, MEDICAL CARE, AND HEATING?: NOT HARD AT ALL

## 2022-07-14 NOTE — PROGRESS NOTES
(RETIN-A) 0.025 % cream Apply topically nightly. 45 g 0    NONFORMULARY  (Patient not taking: Reported on 7/14/2022)       No current facility-administered medications for this visit. Allergies   Allergen Reactions    Nickel Rash       Health Maintenance   Topic Date Due    Hepatitis C screen  Never done    Depression Screen  04/02/2022    Pneumococcal 0-64 years Vaccine (1 - PCV) 07/14/2023 (Originally 2/15/2001)    COVID-19 Vaccine (1) 07/14/2023 (Originally 2/15/2000)    Flu vaccine (1) 09/01/2022    Pap smear  10/14/2024    DTaP/Tdap/Td vaccine (8 - Td or Tdap) 12/14/2028    Hepatitis B vaccine  Completed    Hib vaccine  Completed    Meningococcal (ACWY) vaccine  Completed    HIV screen  Completed    Hepatitis A vaccine  Aged Out    Varicella vaccine  Discontinued       Subjective:      Review of Systems   Constitutional: Negative for chills and fever. HENT: Negative for rhinorrhea and sore throat. Eyes: Negative for discharge and redness. Respiratory: Negative for cough, shortness of breath and wheezing. Cardiovascular: Negative for chest pain and palpitations. Gastrointestinal: Negative for abdominal pain, diarrhea, nausea and vomiting. Genitourinary: Negative for dysuria and frequency. Musculoskeletal: Negative for arthralgias and myalgias. Neurological: Negative for dizziness, light-headedness and headaches. Psychiatric/Behavioral: Negative for sleep disturbance. Objective:     /70   Pulse 73   Ht 5' 9.5\" (1.765 m)   Wt 154 lb (69.9 kg)   SpO2 99%   BMI 22.42 kg/m²   Physical Exam  Vitals and nursing note reviewed. Constitutional:       Appearance: Normal appearance. She is not ill-appearing. Cardiovascular:      Rate and Rhythm: Normal rate and regular rhythm. Heart sounds: Normal heart sounds. Pulmonary:      Effort: Pulmonary effort is normal.      Breath sounds: Normal breath sounds.    Neurological:      Mental Status: She is alert and oriented to person, place, and time. Psychiatric:         Mood and Affect: Mood normal.         Behavior: Behavior normal.         Thought Content: Thought content normal.         Judgment: Judgment normal.         Assessment:       Diagnosis Orders   1. Hypermobile Adonay-Danlos syndrome  External Referral To Physical Therapy   2. POTS (postural orthostatic tachycardia syndrome)          Plan:    Print out Tilt table findings for Dr Leonid Burr  Referral for PT  Continue to hydrate well  Continue with Cardiology    Return in about 1 year (around 7/14/2023), or if symptoms worsen or fail to improve, for Physical.    Orders Placed This Encounter   Procedures    External Referral To Physical Therapy     Referral Priority:   Routine     Referral Type:   Eval and Treat     Referral Reason:   Specialty Services Required     Requested Specialty:   Physical Therapist     Number of Visits Requested:   1     No orders of the defined types were placed in this encounter. Patient given educational materials - see patient instructions. Discussed use, benefit, and side effects of prescribed medications. All patient questions answered. Pt voiced understanding. Reviewed health maintenance. Instructed to continue current medications, diet and exercise. Patient agreed with treatment plan. Follow up as directed.      Electronically signed by Rhonda Barnes PA-C on 7/14/2022 at 9:01 AM

## 2022-08-09 ENCOUNTER — OFFICE VISIT (OUTPATIENT)
Dept: PRIMARY CARE CLINIC | Age: 27
End: 2022-08-09
Payer: COMMERCIAL

## 2022-08-09 VITALS
DIASTOLIC BLOOD PRESSURE: 76 MMHG | BODY MASS INDEX: 22.25 KG/M2 | HEIGHT: 70 IN | OXYGEN SATURATION: 100 % | SYSTOLIC BLOOD PRESSURE: 122 MMHG | WEIGHT: 155.4 LBS | HEART RATE: 70 BPM

## 2022-08-09 DIAGNOSIS — Q79.62 EHLERS-DANLOS SYNDROME TYPE III: Primary | ICD-10-CM

## 2022-08-09 DIAGNOSIS — M21.6X9 INVERSION DEFORMITY OF FOOT, UNSPECIFIED LATERALITY: ICD-10-CM

## 2022-08-09 PROCEDURE — G8420 CALC BMI NORM PARAMETERS: HCPCS | Performed by: PHYSICIAN ASSISTANT

## 2022-08-09 PROCEDURE — G8427 DOCREV CUR MEDS BY ELIG CLIN: HCPCS | Performed by: PHYSICIAN ASSISTANT

## 2022-08-09 PROCEDURE — 1036F TOBACCO NON-USER: CPT | Performed by: PHYSICIAN ASSISTANT

## 2022-08-09 PROCEDURE — 99213 OFFICE O/P EST LOW 20 MIN: CPT | Performed by: PHYSICIAN ASSISTANT

## 2022-08-09 ASSESSMENT — PATIENT HEALTH QUESTIONNAIRE - PHQ9
1. LITTLE INTEREST OR PLEASURE IN DOING THINGS: 0
SUM OF ALL RESPONSES TO PHQ QUESTIONS 1-9: 0
SUM OF ALL RESPONSES TO PHQ9 QUESTIONS 1 & 2: 0
SUM OF ALL RESPONSES TO PHQ QUESTIONS 1-9: 0
SUM OF ALL RESPONSES TO PHQ QUESTIONS 1-9: 0
2. FEELING DOWN, DEPRESSED OR HOPELESS: 0
SUM OF ALL RESPONSES TO PHQ QUESTIONS 1-9: 0

## 2022-08-09 NOTE — PROGRESS NOTES
[facility] CARE  [address] [place] [id number]  Dept: [facility] Marta Wilhelm is a 32 y.o. female who presents today for her medical conditions/complaints as noted below. Chief Complaint   Patient presents with    Foot Problem     Patient states she needs a foot exam for orthotics        HPI:     Foot Problem  Has had foot problems for years---has been recently diagnosed with ED syndrome and needs orthotics. Here for a foot exam    No results found for: LDLCHOLESTEROL, LDLCALC    (goal LDL is <100)   AST (U/L)   Date Value   2021 13     ALT (U/L)   Date Value   2021 9     BUN (mg/dL)   Date Value   2021 11     BP Readings from Last 3 Encounters:   22 122/76   22 126/70   21 124/68          (goal 120/80)    Past Medical History:   Diagnosis Date    Ankle sprain     left    Eczema     Flat foot     Heart murmur of      Knee dislocation     bilateral    Wrist fracture, left       Past Surgical History:   Procedure Laterality Date    WISDOM TOOTH EXTRACTION         Family History   Problem Relation Age of Onset    Atrial Fibrillation Mother     Breast Cancer Maternal Aunt     Heart Failure Maternal Grandmother     Cancer Maternal Grandfather         multiple myeloma    Heart Disease Paternal Grandfather        Social History     Tobacco Use    Smoking status: Never    Smokeless tobacco: Never   Substance Use Topics    Alcohol use: Yes     Alcohol/week: 4.0 - 5.0 standard drinks     Types: 4 - 5 Cans of beer per week      Current Outpatient Medications   Medication Sig Dispense Refill    Norethin Ace-Eth Estrad-FE 1-20 MG-MCG(24) CHEW       tretinoin (RETIN-A) 0.025 % cream Apply topically nightly. 45 g 0    NONFORMULARY  (Patient not taking: Reported on 2022)       No current facility-administered medications for this visit.      Allergies   Allergen Reactions    [facility] Maintenance   Topic Date Due    Hepatitis C screen  Never done    Pneumococcal 0-64 years Vaccine (1 - PCV) 07/14/2023 (Originally 2/15/2001)    COVID-19 Vaccine (1) 07/14/2023 (Originally 1995)    Flu vaccine (1) 09/01/2022    Depression Screen  07/14/2023    Pap smear  10/14/2024    DTaP/Tdap/Td vaccine (8 - Td or Tdap) 12/14/2028    Hepatitis B vaccine  Completed    Hib vaccine  Completed    Meningococcal (ACWY) vaccine  Completed    HIV screen  Completed    Hepatitis A vaccine  Aged Out    Varicella vaccine  Discontinued       Subjective:      Review of Systems    Objective:     /76   Pulse 70   Ht 5' 9.5\" (1.765 m)   Wt 155 lb 6.4 oz (70.5 kg)   SpO2 100%   BMI 22.62 kg/m²   Physical Exam  Cardiovascular:      Pulses:           Dorsalis pedis pulses are 1+ on the right side and 1+ on the left side. Posterior tibial pulses are 2+ on the right side and 2+ on the left side. Musculoskeletal:      Right foot: Normal range of motion. Deformity (angle of toes) present. No bunion, foot drop or prominent metatarsal heads. Left foot: Normal range of motion. No bunion, foot drop or prominent metatarsal heads. Deformity: angle of toes. Feet:      Right foot:      Protective Sensation: 7 sites tested. 7 sites sensed. Skin integrity: Skin integrity normal.      Toenail Condition: Right toenails are normal.      Left foot:      Protective Sensation: 7 sites tested. 7 sites sensed. Skin integrity: Skin integrity normal.      Toenail Condition: Left toenails are normal.      Comments: Left rolls in and right is slightly rolling in      Assessment:       Diagnosis Orders   1. Adonay-Danlos syndrome type III        2. Inversion deformity of foot, unspecified laterality             Plan:    Exam complete and script given    Return if symptoms worsen or fail to improve. No orders of the defined types were placed in this encounter.     No orders of the defined types were placed in this encounter. Patient given educational materials - see patient instructions. Discussed use, benefit, and side effects of prescribed medications. All patient questions answered. Pt voiced understanding. Reviewed health maintenance. Instructed to continue current medications, diet and exercise. Patient agreed with treatment plan. Follow up as directed.      Electronically signed by Kymberly Mota PA-C on 8/9/2022 at 1:32 PM